# Patient Record
Sex: MALE | Race: WHITE
[De-identification: names, ages, dates, MRNs, and addresses within clinical notes are randomized per-mention and may not be internally consistent; named-entity substitution may affect disease eponyms.]

---

## 2018-01-15 ENCOUNTER — HOSPITAL ENCOUNTER (OUTPATIENT)
Dept: HOSPITAL 72 - ULS | Age: 63
Discharge: HOME | End: 2018-01-15
Payer: COMMERCIAL

## 2018-01-15 DIAGNOSIS — R10.9: ICD-10-CM

## 2018-01-15 DIAGNOSIS — B19.20: Primary | ICD-10-CM

## 2018-01-15 PROCEDURE — 76700 US EXAM ABDOM COMPLETE: CPT

## 2018-01-15 NOTE — DIAGNOSTIC IMAGING REPORT
Indication: Hepatitis C

 

Technique: Multiplanar grayscale and color Doppler imaging of the abdomen

 

Comparison: 1/18/2016

 

Findings: 

Pancreas is not well-visualized due to overlying bowel gas.

 

Imaged portions of the aorta and inferior vena cava are normal in caliber.

 

The liver is normal in size; the right lobe measures 16 cm in length. There is

question of subtle diffusely increased hepatic echogenicity. Liver contour is smooth.

No focal hepatic mass lesion is appreciated sonographically. The main portal vein is

patent with normal direction of flow. Gallbladder is unremarkable in appearance.

There is no gallbladder wall thickening or pericholecystic fluid. No cholelithiasis

seen. Common bile duct measures 4.5 mm. No intrahepatic biliary ductal dilatation

noted.

 

 Kidneys demonstrate normal parenchymal thickness and echogenicity. No hydronephrosis

or sonographically appreciable renal stones bilaterally.

 

Spleen is normal in size and appearance.

 

There is no ascites.

 

Impression: 

Increased hepatic echogenicity, possibly reflective of hepatocellular disease

including given history of hepatitis C. Additional considerations include hepatic

steatosis. No focal hepatic mass lesion identified sonographically. No evidence of

liver surface contour irregularity/nodularity.

 

No ascites.

## 2019-01-14 ENCOUNTER — HOSPITAL ENCOUNTER (OUTPATIENT)
Dept: HOSPITAL 72 - ULS | Age: 64
Discharge: HOME | End: 2019-01-14
Payer: COMMERCIAL

## 2019-01-14 DIAGNOSIS — R10.9: ICD-10-CM

## 2019-01-14 DIAGNOSIS — B19.20: Primary | ICD-10-CM

## 2019-01-14 PROCEDURE — 76700 US EXAM ABDOM COMPLETE: CPT

## 2019-01-14 NOTE — DIAGNOSTIC IMAGING REPORT
Indication:Abdominal pain. Hepatitis C

 

Technique: Grayscale and duplex Doppler imaging of the abdomen performed.

 

Comparison: 1/15/2018

 

Findings:

 

The liver is mildly heterogeneous. No mass or focal lesions are identified. The

gallbladder is unremarkable.  The demonstrated part of the pancreas, aorta and IVC

show no abnormalities.   Both kidneys appear unremarkable.  The spleen is normal in

size. There is no biliary ductal dilatation identified. Doppler evaluation of the

main portal vein shows patency. There is no ascites. No hydronephrosis seen.

 

Impression:

 

No acute findings.

 

Mild heterogeneity of the liver nonspecific in nature. No mass identified

## 2020-01-14 ENCOUNTER — HOSPITAL ENCOUNTER (OUTPATIENT)
Dept: HOSPITAL 72 - ULS | Age: 65
Discharge: HOME | End: 2020-01-14
Payer: COMMERCIAL

## 2020-01-14 DIAGNOSIS — R10.9: Primary | ICD-10-CM

## 2020-01-14 DIAGNOSIS — Z86.19: ICD-10-CM

## 2020-01-14 PROCEDURE — 76700 US EXAM ABDOM COMPLETE: CPT

## 2020-01-14 NOTE — DIAGNOSTIC IMAGING REPORT
Indication: Abdominal pain, history of hepatitis C

 

Technique: Gray-scale and duplex images of the upper abdomen were obtained

 

Comparison: 1/14/2019

 

Findings: Gallbladder is unremarkable, without stones, wall thickening, nor

pericholecystic fluid.  Sonographic Pacheco's sign is negative.  Common bile duct

measures for mm in diameter.  No intrahepatic biliary ductal dilatation.  Liver

demonstrates normal echogenicity, no focal abnormality.   Portal vein and hepatic

veins are patent.   Pancreas is unremarkable.    Spleen is unremarkable.  Left kidney

measures 11.8 cm in length.  Right kidney measures 10.5 cm length.  Both kidneys

demonstrate normal echogenicity.  There is no hydronephrosis.   No focal abnormality

. Abdominal aorta is partially obscured by bowel gas, visualized portions are

non-aneurysmal .

 

Impression: Negative

 

Note nonvisualization of portions of the abdominal aorta